# Patient Record
(demographics unavailable — no encounter records)

---

## 2024-10-22 NOTE — ADDENDUM
[FreeTextEntry1] : 3/14/2024: Bjorn's iron studies came back normal. He does not need iron supplementation. Informed mom of the results.

## 2024-10-25 NOTE — PHYSICAL EXAM
[Normal] : affect appropriate [de-identified] : Tonsils not enlarged [de-identified] : liver/spleen not palpable

## 2024-10-25 NOTE — HISTORY OF PRESENT ILLNESS
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 He has been doing well. No history of jaundice or phototherapy. No illnesses. No other complaints.  [de-identified] : Bjorn is here for a routine f/u visit.  He has been doing well.  Mom thinks he may have obstructive sleep apnea since he snores loudly and occasionally stops breathing.  He was already evaluated by ENT.  No fatigue or jaundice.  No fever, rhinorrhea or cough.  No vomiting or diarrhea.  Mom has been checking the spleen and it has not been enlarged.

## 2024-10-25 NOTE — REASON FOR VISIT
[Follow-Up Visit] : a follow-up visit for [Mother] : mother [Medical Records] : medical records [FreeTextEntry2] : HbC disease

## 2024-10-25 NOTE — CONSULT LETTER
[Dear  ___] : Dear  [unfilled], [Consult Letter:] : I had the pleasure of evaluating your patient, [unfilled]. [Please see my note below.] : Please see my note below. [Consult Closing:] : Thank you very much for allowing me to participate in the care of this patient.  If you have any questions, please do not hesitate to contact me. [Sincerely,] : Sincerely, [FreeTextEntry2] : Dr. Fierro [place] [address] Tel: [phone] [FreeTextEntry3] : Whit Meeks MD, FAAP\par  Professor of Pediatrics\par  [facility]\par  Associate Chief for Hematology\par  Section Head, Sickle Cell Disease\par  Division of Hematology/Oncology and Stem Cell Transplantation\par  [facility]\par  Tel: [phone]\par  Fax: [phone]\par  e-mail:[email or web address]  \par  \par

## 2024-10-25 NOTE — CONSULT LETTER
[Dear  ___] : Dear  [unfilled], [Consult Letter:] : I had the pleasure of evaluating your patient, [unfilled]. [Please see my note below.] : Please see my note below. [Consult Closing:] : Thank you very much for allowing me to participate in the care of this patient.  If you have any questions, please do not hesitate to contact me. [Sincerely,] : Sincerely, [FreeTextEntry2] : Dr. Fierro [place] [address] Tel: [phone] [FreeTextEntry3] : Whit Meeks MD, FAAP\par  Professor of Pediatrics\par  [facility]\par  Associate Chief for Hematology\par  Section Head, Sickle Cell Disease\par  Division of Hematology/Oncology and Stem Cell Transplantation\par  [facility]\par  Tel: [phone]\par  Fax: [phone]\par  e-mail:[email or web address]  \par  \par

## 2024-10-25 NOTE — SOCIAL HISTORY
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

## 2024-10-25 NOTE — FAMILY HISTORY
[Age ___] : Age: [unfilled] [Healthy] : healthy [Black/] : Black/ [] : Non- [FreeTextEntry2] : From [facility]; HbC trait [de-identified] : [place]; hemoglobin trait status unknown

## 2024-10-25 NOTE — HISTORY OF PRESENT ILLNESS
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 He has been doing well. No history of jaundice or phototherapy. No illnesses. No other complaints.  [de-identified] : Bjorn is here for a routine f/u visit.  He has been doing well.  Mom thinks he may have obstructive sleep apnea since he snores loudly and occasionally stops breathing.  He was already evaluated by ENT.  No fatigue or jaundice.  No fever, rhinorrhea or cough.  No vomiting or diarrhea.  Mom has been checking the spleen and it has not been enlarged.

## 2024-10-25 NOTE — REVIEW OF SYSTEMS
[Anemia] : anemia [Negative] : Allergic/Immunologic [Nasal Discharge] : no nasal discharge [FreeTextEntry1] : HbC disease

## 2024-10-25 NOTE — PAST MEDICAL HISTORY
[At ___ Weeks Gestation] : at [unfilled] weeks gestation [place] [Normal Vaginal Route] : by normal vaginal route [None] : there were no delivery complications [Jaundice] : not jaundice [Phototherapy] : no phototherapy [Transfusion] : no transfusion [Exchange Transfusion] : no exchange transfusion [FreeTextEntry1] : 9lb9oz

## 2024-10-25 NOTE — SOCIAL HISTORY
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

## 2024-10-25 NOTE — FAMILY HISTORY
[Age ___] : Age: [unfilled] [Healthy] : healthy [Black/] : Black/ [] : Non- [FreeTextEntry2] : From [facility]; HbC trait [de-identified] : [place]; hemoglobin trait status unknown

## 2024-10-25 NOTE — PHYSICAL EXAM
[Normal] : affect appropriate [de-identified] : Tonsils not enlarged [de-identified] : liver/spleen not palpable

## 2024-10-25 NOTE — PAST MEDICAL HISTORY
[At ___ Weeks Gestation] : at [unfilled] weeks gestation [place] [Normal Vaginal Route] : by normal vaginal route [None] : there were no delivery complications [Jaundice] : not jaundice [Phototherapy] : no phototherapy [Transfusion] : no transfusion [Exchange Transfusion] : no exchange transfusion [FreeTextEntry1] : 9lb9oz Statement Selected